# Patient Record
Sex: MALE | Race: WHITE | Employment: UNEMPLOYED | URBAN - METROPOLITAN AREA
[De-identification: names, ages, dates, MRNs, and addresses within clinical notes are randomized per-mention and may not be internally consistent; named-entity substitution may affect disease eponyms.]

---

## 2020-01-09 ENCOUNTER — HOSPITAL ENCOUNTER (OUTPATIENT)
Age: 30
Setting detail: OBSERVATION
Discharge: HOME | End: 2020-01-10
Attending: EMERGENCY MEDICINE | Admitting: INTERNAL MEDICINE
Payer: COMMERCIAL

## 2020-01-09 ENCOUNTER — APPOINTMENT (OUTPATIENT)
Dept: CT IMAGING | Age: 30
End: 2020-01-09
Payer: COMMERCIAL

## 2020-01-09 ENCOUNTER — APPOINTMENT (OUTPATIENT)
Dept: GENERAL RADIOLOGY | Age: 30
End: 2020-01-09
Payer: COMMERCIAL

## 2020-01-09 PROBLEM — R51.9 HEADACHE, UNSPECIFIED HEADACHE TYPE: Status: ACTIVE | Noted: 2020-01-09

## 2020-01-09 LAB
ABSOLUTE EOS #: 0.5 K/UL (ref 0–0.4)
ABSOLUTE IMMATURE GRANULOCYTE: ABNORMAL K/UL (ref 0–0.3)
ABSOLUTE LYMPH #: 2.4 K/UL (ref 1–4.8)
ABSOLUTE MONO #: 0.6 K/UL (ref 0.1–1.3)
ALBUMIN SERPL-MCNC: 4.7 G/DL (ref 3.5–5.2)
ALBUMIN/GLOBULIN RATIO: ABNORMAL (ref 1–2.5)
ALP BLD-CCNC: 79 U/L (ref 40–129)
ALT SERPL-CCNC: 47 U/L (ref 5–41)
AMPHETAMINE SCREEN URINE: NEGATIVE
ANION GAP SERPL CALCULATED.3IONS-SCNC: 15 MMOL/L (ref 9–17)
AST SERPL-CCNC: 39 U/L
BARBITURATE SCREEN URINE: NEGATIVE
BASOPHILS # BLD: 0 % (ref 0–2)
BASOPHILS ABSOLUTE: 0 K/UL (ref 0–0.2)
BENZODIAZEPINE SCREEN, URINE: NEGATIVE
BILIRUB SERPL-MCNC: 0.52 MG/DL (ref 0.3–1.2)
BILIRUBIN URINE: NEGATIVE
BUN BLDV-MCNC: 22 MG/DL (ref 6–20)
BUN/CREAT BLD: ABNORMAL (ref 9–20)
BUPRENORPHINE URINE: NORMAL
CALCIUM SERPL-MCNC: 9.7 MG/DL (ref 8.6–10.4)
CANNABINOID SCREEN URINE: NEGATIVE
CHLORIDE BLD-SCNC: 98 MMOL/L (ref 98–107)
CO2: 23 MMOL/L (ref 20–31)
COCAINE METABOLITE, URINE: NEGATIVE
COLOR: YELLOW
COMMENT UA: NORMAL
CREAT SERPL-MCNC: 0.99 MG/DL (ref 0.7–1.2)
DIFFERENTIAL TYPE: ABNORMAL
DIRECT EXAM: NORMAL
EOSINOPHILS RELATIVE PERCENT: 5 % (ref 0–4)
GFR AFRICAN AMERICAN: >60 ML/MIN
GFR NON-AFRICAN AMERICAN: >60 ML/MIN
GFR SERPL CREATININE-BSD FRML MDRD: ABNORMAL ML/MIN/{1.73_M2}
GFR SERPL CREATININE-BSD FRML MDRD: ABNORMAL ML/MIN/{1.73_M2}
GLUCOSE BLD-MCNC: 72 MG/DL (ref 75–110)
GLUCOSE BLD-MCNC: 89 MG/DL (ref 70–99)
GLUCOSE URINE: NEGATIVE
HCT VFR BLD CALC: 47.5 % (ref 41–53)
HEMOGLOBIN: 15.8 G/DL (ref 13.5–17.5)
IMMATURE GRANULOCYTES: ABNORMAL %
KETONES, URINE: NEGATIVE
LEUKOCYTE ESTERASE, URINE: NEGATIVE
LIPASE: 23 U/L (ref 13–60)
LYMPHOCYTES # BLD: 24 % (ref 24–44)
Lab: NORMAL
MCH RBC QN AUTO: 28.5 PG (ref 26–34)
MCHC RBC AUTO-ENTMCNC: 33.2 G/DL (ref 31–37)
MCV RBC AUTO: 85.9 FL (ref 80–100)
MDMA URINE: NORMAL
METHADONE SCREEN, URINE: NEGATIVE
METHAMPHETAMINE, URINE: NORMAL
MONOCYTES # BLD: 6 % (ref 1–7)
NITRITE, URINE: NEGATIVE
NRBC AUTOMATED: ABNORMAL PER 100 WBC
OPIATES, URINE: NEGATIVE
OXYCODONE SCREEN URINE: NEGATIVE
PDW BLD-RTO: 13.2 % (ref 11.5–14.9)
PH UA: 6 (ref 5–8)
PHENCYCLIDINE, URINE: NEGATIVE
PLATELET # BLD: 289 K/UL (ref 150–450)
PLATELET ESTIMATE: ABNORMAL
PMV BLD AUTO: 7.2 FL (ref 6–12)
POTASSIUM SERPL-SCNC: 3.9 MMOL/L (ref 3.7–5.3)
PROPOXYPHENE, URINE: NORMAL
PROTEIN UA: NEGATIVE
RBC # BLD: 5.53 M/UL (ref 4.5–5.9)
RBC # BLD: ABNORMAL 10*6/UL
SEG NEUTROPHILS: 65 % (ref 36–66)
SEGMENTED NEUTROPHILS ABSOLUTE COUNT: 6.4 K/UL (ref 1.3–9.1)
SODIUM BLD-SCNC: 136 MMOL/L (ref 135–144)
SPECIFIC GRAVITY UA: 1.02 (ref 1–1.03)
SPECIMEN DESCRIPTION: NORMAL
TEST INFORMATION: NORMAL
TOTAL PROTEIN: 8 G/DL (ref 6.4–8.3)
TRICYCLIC ANTIDEPRESSANTS, UR: NORMAL
TSH SERPL DL<=0.05 MIU/L-ACNC: 2.38 MIU/L (ref 0.3–5)
TURBIDITY: CLEAR
URINE HGB: NEGATIVE
UROBILINOGEN, URINE: NORMAL
WBC # BLD: 9.9 K/UL (ref 3.5–11)
WBC # BLD: ABNORMAL 10*3/UL

## 2020-01-09 PROCEDURE — 96374 THER/PROPH/DIAG INJ IV PUSH: CPT

## 2020-01-09 PROCEDURE — 71045 X-RAY EXAM CHEST 1 VIEW: CPT

## 2020-01-09 PROCEDURE — 70496 CT ANGIOGRAPHY HEAD: CPT

## 2020-01-09 PROCEDURE — 82947 ASSAY GLUCOSE BLOOD QUANT: CPT

## 2020-01-09 PROCEDURE — G0378 HOSPITAL OBSERVATION PER HR: HCPCS

## 2020-01-09 PROCEDURE — 96375 TX/PRO/DX INJ NEW DRUG ADDON: CPT

## 2020-01-09 PROCEDURE — 2580000003 HC RX 258: Performed by: INTERNAL MEDICINE

## 2020-01-09 PROCEDURE — 99285 EMERGENCY DEPT VISIT HI MDM: CPT

## 2020-01-09 PROCEDURE — 84443 ASSAY THYROID STIM HORMONE: CPT

## 2020-01-09 PROCEDURE — 83690 ASSAY OF LIPASE: CPT

## 2020-01-09 PROCEDURE — 85025 COMPLETE CBC W/AUTO DIFF WBC: CPT

## 2020-01-09 PROCEDURE — 99446 NTRPROF PH1/NTRNET/EHR 5-10: CPT | Performed by: PSYCHIATRY & NEUROLOGY

## 2020-01-09 PROCEDURE — 6360000002 HC RX W HCPCS: Performed by: EMERGENCY MEDICINE

## 2020-01-09 PROCEDURE — 70450 CT HEAD/BRAIN W/O DYE: CPT

## 2020-01-09 PROCEDURE — 80053 COMPREHEN METABOLIC PANEL: CPT

## 2020-01-09 PROCEDURE — 81003 URINALYSIS AUTO W/O SCOPE: CPT

## 2020-01-09 PROCEDURE — 87804 INFLUENZA ASSAY W/OPTIC: CPT

## 2020-01-09 PROCEDURE — 6360000004 HC RX CONTRAST MEDICATION: Performed by: EMERGENCY MEDICINE

## 2020-01-09 PROCEDURE — 36415 COLL VENOUS BLD VENIPUNCTURE: CPT

## 2020-01-09 PROCEDURE — 2580000003 HC RX 258: Performed by: EMERGENCY MEDICINE

## 2020-01-09 PROCEDURE — 80307 DRUG TEST PRSMV CHEM ANLYZR: CPT

## 2020-01-09 RX ORDER — ONDANSETRON 2 MG/ML
4 INJECTION INTRAMUSCULAR; INTRAVENOUS EVERY 6 HOURS PRN
Status: DISCONTINUED | OUTPATIENT
Start: 2020-01-09 | End: 2020-01-10 | Stop reason: HOSPADM

## 2020-01-09 RX ORDER — DIPHENHYDRAMINE HYDROCHLORIDE 50 MG/ML
25 INJECTION INTRAMUSCULAR; INTRAVENOUS ONCE
Status: COMPLETED | OUTPATIENT
Start: 2020-01-09 | End: 2020-01-09

## 2020-01-09 RX ORDER — SODIUM CHLORIDE 0.9 % (FLUSH) 0.9 %
10 SYRINGE (ML) INJECTION PRN
Status: DISCONTINUED | OUTPATIENT
Start: 2020-01-09 | End: 2020-01-10 | Stop reason: HOSPADM

## 2020-01-09 RX ORDER — PROCHLORPERAZINE EDISYLATE 5 MG/ML
10 INJECTION INTRAMUSCULAR; INTRAVENOUS EVERY 6 HOURS PRN
Status: DISCONTINUED | OUTPATIENT
Start: 2020-01-09 | End: 2020-01-09

## 2020-01-09 RX ORDER — 0.9 % SODIUM CHLORIDE 0.9 %
80 INTRAVENOUS SOLUTION INTRAVENOUS ONCE
Status: COMPLETED | OUTPATIENT
Start: 2020-01-09 | End: 2020-01-09

## 2020-01-09 RX ORDER — SODIUM CHLORIDE 0.9 % (FLUSH) 0.9 %
10 SYRINGE (ML) INJECTION PRN
Status: DISCONTINUED | OUTPATIENT
Start: 2020-01-09 | End: 2020-01-10 | Stop reason: SDUPTHER

## 2020-01-09 RX ORDER — SODIUM CHLORIDE 0.9 % (FLUSH) 0.9 %
10 SYRINGE (ML) INJECTION EVERY 12 HOURS SCHEDULED
Status: DISCONTINUED | OUTPATIENT
Start: 2020-01-09 | End: 2020-01-10 | Stop reason: HOSPADM

## 2020-01-09 RX ORDER — 0.9 % SODIUM CHLORIDE 0.9 %
1000 INTRAVENOUS SOLUTION INTRAVENOUS ONCE
Status: COMPLETED | OUTPATIENT
Start: 2020-01-09 | End: 2020-01-09

## 2020-01-09 RX ORDER — SODIUM CHLORIDE 9 MG/ML
INJECTION, SOLUTION INTRAVENOUS CONTINUOUS
Status: DISCONTINUED | OUTPATIENT
Start: 2020-01-09 | End: 2020-01-10 | Stop reason: HOSPADM

## 2020-01-09 RX ADMIN — DIPHENHYDRAMINE HYDROCHLORIDE 25 MG: 50 INJECTION INTRAMUSCULAR; INTRAVENOUS at 19:01

## 2020-01-09 RX ADMIN — SODIUM CHLORIDE 1000 ML: 9 INJECTION, SOLUTION INTRAVENOUS at 19:01

## 2020-01-09 RX ADMIN — Medication 10 ML: at 23:00

## 2020-01-09 RX ADMIN — PROCHLORPERAZINE EDISYLATE 10 MG: 5 INJECTION INTRAMUSCULAR; INTRAVENOUS at 19:01

## 2020-01-09 RX ADMIN — IOPAMIDOL 75 ML: 755 INJECTION, SOLUTION INTRAVENOUS at 19:43

## 2020-01-09 RX ADMIN — Medication 10 ML: at 19:43

## 2020-01-09 RX ADMIN — SODIUM CHLORIDE 80 ML: 9 INJECTION, SOLUTION INTRAVENOUS at 19:43

## 2020-01-09 RX ADMIN — SODIUM CHLORIDE: 9 INJECTION, SOLUTION INTRAVENOUS at 23:00

## 2020-01-09 ASSESSMENT — ENCOUNTER SYMPTOMS
CONSTIPATION: 0
NAUSEA: 0
COLOR CHANGE: 0
DIARRHEA: 0
TROUBLE SWALLOWING: 0
BLOOD IN STOOL: 0
SORE THROAT: 0
VOMITING: 0
BACK PAIN: 0
SHORTNESS OF BREATH: 0
COUGH: 0
ABDOMINAL PAIN: 0

## 2020-01-09 ASSESSMENT — PAIN DESCRIPTION - LOCATION: LOCATION: HEAD

## 2020-01-09 ASSESSMENT — PAIN SCALES - GENERAL
PAINLEVEL_OUTOF10: 0
PAINLEVEL_OUTOF10: 7
PAINLEVEL_OUTOF10: 0

## 2020-01-09 NOTE — ED PROVIDER NOTES
[address]  eMERGENCY dEPARTMENT eNCOUnter    Pt Name: Cirilo Stockton  MRN: [id number]  YOB: 1990  Date of evaluation:1/9/20  PCP: No primary care provider on file. CHIEF COMPLAINT       Chief Complaint   Patient presents with    Headache    Excessive Sweating    Fever       HISTORY OF PRESENT ILLNESS    Cirilo Stockton is a 30 y.o. male who presents with a chief complaint of a headache. Patient is deaf and history is very difficult to obtain. Most of the information I obtained from urgent care note from earlier today. Apparently he had a headache that started around 1:30 PM today. Unclear whether or not this started acutely or not. Usually does not get headaches. Is diffuse, severe, nothing makes it better or worse. Per his family he has been having URI symptoms including fever, congestion for the past several days. His father states that he has been going to work and working double shifts and really wearing himself out. He states that he has not been eating or drinking. They have no family history of aneurysms. Most of this information was able to be confirmed with the patient through writing. His father does confirm that he has a cochlear implant however it does not function. Symptoms are acute. Symptoms are severe. Nothing else make symptoms better or worse. No other additional history able to be obtained. REVIEW OF SYSTEMS       Review of Systems   Constitutional: Positive for fatigue and fever. Negative for chills. HENT: Positive for congestion. Negative for ear pain, sore throat and trouble swallowing. Eyes: Negative for visual disturbance. Respiratory: Negative for cough and shortness of breath. Cardiovascular: Negative for chest pain, palpitations and leg swelling. Gastrointestinal: Negative for abdominal pain, blood in stool, constipation, diarrhea, nausea and vomiting. Genitourinary: Negative for dysuria and flank pain.    Musculoskeletal: visualized. Patient has a right-sided cochlear implant. Findings were discussed with Jerome White at 7:00 pm on 1/9/2020. ED BEDSIDE ULTRASOUND:      LABS:  Labs Reviewed   CBC WITH AUTO DIFFERENTIAL - Abnormal; Notable for the following components:       Result Value    Eosinophils % 5 (*)     Absolute Eos # 0.50 (*)     All other components within normal limits   COMPREHENSIVE METABOLIC PANEL - Abnormal; Notable for the following components:    BUN 22 (*)     ALT 47 (*)     All other components within normal limits   POC GLUCOSE FINGERSTICK - Abnormal; Notable for the following components:    POC Glucose 72 (*)     All other components within normal limits   RAPID INFLUENZA A/B ANTIGENS   LIPASE   URINE RT REFLEX TO CULTURE   URINE DRUG SCREEN   TSH WITH REFLEX   POCT GLUCOSE         EMERGENCY DEPARTMENT COURSE:   Vitals:    Vitals:    01/09/20 2130 01/09/20 2145 01/09/20 2200 01/09/20 2217   BP: 91/69 105/73 102/70 108/82   Pulse:  66 71 81   Resp:  20 20 13   Temp:    97.8 °F (36.6 °C)   TempSrc:    Oral   SpO2: 97%   98%   Weight:       Height:         10:22 PM  CT head is unremarkable. No obvious evidence of intracranial hemorrhage or any other acute findings. Patient is much more comfortable at this time. He was able to communicate that his headache has improved. He was resting comfortably on reexamination. I did speak with Dr. Darin Self interventional neurologist as patient was called as a stroke alert. We agreed that if anything that sounded like a hemorrhagic picture. He recommended getting a CTA head and neck and reevaluate after that. Head and neck CTA is unremarkable. No evidence of dissection, no evidence of aneurysm. Clinically I have a very low suspicion for subarachnoid hemorrhage at this time. Patient is clinically very much improved. His vital signs are normal.  He is very comfortable, sleeping, has no neurologic deficit from what we can see.   Per his family he seems to be acting at his baseline. I do think patient is most likely dehydrated and very fatigued from a viral illness. I spoke with family and patient at length about admission. Will admit overnight for IV fluids, reassessment in the morning. Clinically have a low suspicion for meningitis. Has been febrile here. He has no nuchal rigidity on my exam.  I spoke with Dr. Kate Williamson who accepted patient for admission. Bridging orders placed. CRITICALCARE:      CONSULTS:  IP CONSULT TO INTERNAL MEDICINE      PROCEDURES:      FINAL IMPRESSION      1. Headache, unspecified headache type    2. Viral illness            DISPOSITION/PLAN   DISPOSITION        Admission    PATIENT REFERRED TO:  No follow-up provider specified.     DISCHARGE MEDICATIONS:  New Prescriptions    No medications on file       (Please note that portions ofthis note were completed with a voice recognition program.  Efforts were made to edit the dictations but occasionally words are mis-transcribed.)    Teresa Patel DO  Attending Emergency Physician          Teresa Patel DO  01/09/20 9261

## 2020-01-09 NOTE — ED NOTES
Ivette Rinne returned page at [place] [address] and consult completed with .      Leah Addison  01/09/20 2207

## 2020-01-10 VITALS
TEMPERATURE: 97.5 F | WEIGHT: 180 LBS | HEART RATE: 75 BPM | OXYGEN SATURATION: 93 % | BODY MASS INDEX: 23.86 KG/M2 | DIASTOLIC BLOOD PRESSURE: 55 MMHG | HEIGHT: 73 IN | SYSTOLIC BLOOD PRESSURE: 93 MMHG | RESPIRATION RATE: 16 BRPM

## 2020-01-10 PROCEDURE — 96372 THER/PROPH/DIAG INJ SC/IM: CPT

## 2020-01-10 PROCEDURE — 99223 1ST HOSP IP/OBS HIGH 75: CPT | Performed by: INTERNAL MEDICINE

## 2020-01-10 PROCEDURE — G0378 HOSPITAL OBSERVATION PER HR: HCPCS

## 2020-01-10 PROCEDURE — 6360000002 HC RX W HCPCS: Performed by: INTERNAL MEDICINE

## 2020-01-10 PROCEDURE — 96375 TX/PRO/DX INJ NEW DRUG ADDON: CPT

## 2020-01-10 RX ORDER — BUTALBITAL, ACETAMINOPHEN AND CAFFEINE 300; 40; 50 MG/1; MG/1; MG/1
1 CAPSULE ORAL EVERY 4 HOURS PRN
Status: DISCONTINUED | OUTPATIENT
Start: 2020-01-10 | End: 2020-01-10 | Stop reason: HOSPADM

## 2020-01-10 RX ORDER — SODIUM CHLORIDE 0.9 % (FLUSH) 0.9 %
10 SYRINGE (ML) INJECTION PRN
Status: DISCONTINUED | OUTPATIENT
Start: 2020-01-10 | End: 2020-01-10 | Stop reason: SDUPTHER

## 2020-01-10 RX ORDER — SODIUM CHLORIDE 0.9 % (FLUSH) 0.9 %
10 SYRINGE (ML) INJECTION EVERY 12 HOURS SCHEDULED
Status: DISCONTINUED | OUTPATIENT
Start: 2020-01-10 | End: 2020-01-10 | Stop reason: SDUPTHER

## 2020-01-10 RX ADMIN — ENOXAPARIN SODIUM 40 MG: 40 INJECTION SUBCUTANEOUS at 09:54

## 2020-01-10 RX ADMIN — ONDANSETRON 4 MG: 2 INJECTION INTRAMUSCULAR; INTRAVENOUS at 09:54

## 2020-01-10 NOTE — ED NOTES
Pts father and step mother at bedside. Writer and Dr Ordaz Postal provided update. Father states pt is deaf bilaterally and uses written communication. Father states that pt has been \"working himself to death\" and adds that he has been working double shifts and picking up a lot of overtime. Father states pt has cochlear implant and that he has had headaches in the past. Father adds that pt has hx of suicidal ideation. Father states that pt texted him late afternoon stating he had a headache. Pt currently resting in bed with eyes closed. Continue to monitor.               Charanjit Borja RN  01/09/20 7308

## 2020-01-10 NOTE — DISCHARGE SUMMARY
[facility]     Department of Internal Medicine    Discharge Summary        Patient Identification:  Maryjane Holliday is a 30 y.o. male. :  1990  MRN: [id number]     Acct: [de-identified]   Admit Date:  2020  Discharge date and time: No discharge date for patient encounter. Attending Provider: Jina Espinoza MD                                     Admission Diagnoses:   Headache, unspecified headache type [R51]    Discharge Diagnoses:   Patient Active Problem List   Diagnosis    Headache, unspecified headache type        Consults:   none      Brief Inpatient course:  30 y.o. male with significant past medical history of ADHD and deafness s/p cochlear implant on right side presented with headache. Patient is deaf so history is obtained from chart review along with writing notes to patient. Patient states that he has had a migraine in the past, last time in 2017, when asked if he takes any medication at home for migraines, he states that he does take Advil which usually helps. It did not help this time. Patient states that he has not had nausea or vomiting but is sensitive to light. He points to the front of his head when asked where the headache is located. Per chart review, patient visited urgent care per medical yesterday and per them know his headache started at 1:30 PM, he states that this is the worst headache he has ever had. Patient also reported fever and dizziness and thus was sent to the ED for further work-up. Stroke alert was called, CT head was negative for subarachnoid or intraparenchymal hemorrhage, CTA showed no aneurysm. Blood pressure on admission was 116/67. Patient's headache improved with pain medication and he had no neurological deficits.     Procedures:  none    Any Hospital Acquired Infections: none      Discharge Functional Status:  stable      Disposition:   home      Patient Instructions:   Discharge Meds:-   Current Discharge Medication List

## 2020-01-10 NOTE — PROGRESS NOTES
Pt admitted to room 2050 from the ER. No signs of distress noted. Vital signs taken, assessment as charted. Pt is deaf and admission was completed via written communication with pen and paper. Will continue to monitor.

## 2020-01-10 NOTE — PROGRESS NOTES
Discharge instructions given to patient. Waiting for father to come and give patient his keys. Will go over discharge instructions with father also when he gets here.

## 2020-01-10 NOTE — VIRTUAL HEALTH
[facility],4Th Floor Stroke and Vascular Neurology Phone Consult for  [facility] Alert through [address] @ 3541 pm  1/9/2020 8:28 PM  Pt Name: Carlton Lerner  MRN: [id number]  YOB: 1990  Date of evaluation: 1/9/2020  Primary Care Physician: No primary care provider on file. Reason for Evaluation: Stroke evaluation with Phone Consult, Discussion and Review of imaging    Carlton Lerner is a 30 y.o. male with history of ADHD and deafness status post cochlear implant. The patient presented with acute onset headache for 1 day. Per ED physician exam the patient does not have a focal symptoms. Patient is hard of hearing and cannot follow commands but able to imitate. Antigravity all extremities. NIH stroke scale is 0. CT head with with streak artifact from the cochlear implant but no evidence of subarachnoid hemorrhage or intraparenchymal hemorrhage. CTA with no aneurysm. Blood pressure on presentation 116/67. Allergies  has No Known Allergies. Medications  Prior to Admission medications    Medication Sig Start Date End Date Taking? Authorizing Provider   ARIPiprazole (ABILIFY PO) Take by mouth    Historical Provider, MD    Scheduled Meds:  Continuous Infusions:  PRN Meds:.prochlorperazine, sodium chloride flush  Past Medical History   has a past medical history of ADHD (attention deficit hyperactivity disorder), Bipolar 1 disorder ([facility] [address] Deaf, and Depression.   Social History  Social History     Socioeconomic History    Marital status: Single     Spouse name: Not on file    Number of children: Not on file    Years of education: Not on file    Highest education level: Not on file   Occupational History    Not on file   Social Needs    Financial resource strain: Not on file    Food insecurity:     Worry: Not on file     Inability: Not on file    Transportation needs:     Medical: Not on file     Non-medical: Not on file   Tobacco Use    Smoking status: Never Smoker

## 2020-01-10 NOTE — CARE COORDINATION
CASE MANAGEMENT NOTE:    Admission Date:  1/9/2020 Napoleon Dowling is a 30 y.o.  male    Admitted for : Headache, unspecified headache type [R51]    Met with:  Patient    PCP: none                         Insurance:    [place] and medicare    Current Residence/ Living Arrangements:  independently at home             Current Services PTA:  No    Is patient agreeable to VNS: No    Freedom of choice provided: NA    List of [address] provided: NA    VNS chosen:  NA    DME:  cochlear implants - Deaf Bilaterally    Home Oxygen: No    Nebulizer: No    CPAP/BIPAP: No    Supplier: N/A    Potential Assistance Needed: No    SNF needed: No    Pharmacy:  [facility] [address]       Is the Patient an [address] with Readmission Risk Score greater than 14%? No  If yes, pt needs a follow up appointment made within 7 days. Does Patient want to use MEDS to BEDS? No    Family Members/Caregivers that pt would like involved in their care:    Yes    If yes, list name here:  Father Verner Murray: Patient             Is patient in Isolation/One on One/Altered Mental Status? No  If yes, skip next question. If no, would they like an I-Pad to  use? No  If yes, call [phone]. Discharge Plan:  1/10/20 - [place] - Patient from home with father, Patient is deaf, has cochlear implants, Denies need for VNS,   New PCP appt made with Dr Manuel Crowder for Monday 1/13/2020 at 4:00p.m. , Patient is discharging today, Father called and let a voice mail message of discharge order. Will continue to follow.  //pf                Electronically signed by: Darin Sen RN on 1/10/2020 at 2:51 PM

## 2020-01-10 NOTE — PROGRESS NOTES
Physical Therapy  DATE: 1/10/2020    NAME: Yuniel Harrison  MRN: [id number]   : 1990    Patient not seen this date for Physical Therapy due to:  [] Blood transfusion in progress  [] Hemodialysis  []  Patient Declined  [] Spine Precautions   [] Strict Bedrest  [] Surgery/ Procedure  [] Testing      [] Other        [x] PT being discontinued at this time. Patient independent. No further needs. 1/10/20: D/C PT. Pt IND in room, demonstrated IND ambulation. No stairs at home. Declines need for PT. Notified RHONDA Simental. 1322. [] PT being discontinued at this time as the patient has been transferred to palliative care. No further needs.     Iglesia Keith, PT

## 2020-01-10 NOTE — H&P
Take by mouth    Historical Provider, MD     Scheduled Meds:   sodium chloride flush  10 mL Intravenous 2 times per day    sodium chloride flush  10 mL Intravenous 2 times per day    enoxaparin  40 mg Subcutaneous Daily     Continuous Infusions:   sodium chloride 100 mL/hr at 01/09/20 2300     PRN Meds:butalbital-APAP-caffeine, sodium chloride flush, sodium chloride flush, magnesium hydroxide, ondansetron    No Known Allergies    SOCIAL HISTORY:   Social History     Socioeconomic History    Marital status: Single     Spouse name: Not on file    Number of children: Not on file    Years of education: Not on file    Highest education level: Not on file   Occupational History    Not on file   Social Needs    Financial resource strain: Not on file    Food insecurity:     Worry: Not on file     Inability: Not on file    Transportation needs:     Medical: Not on file     Non-medical: Not on file   Tobacco Use    Smoking status: Never Smoker   Substance and Sexual Activity    Alcohol use: No    Drug use: Not on file    Sexual activity: Never   Lifestyle    Physical activity:     Days per week: Not on file     Minutes per session: Not on file    Stress: Not on file   Relationships    Social connections:     Talks on phone: Not on file     Gets together: Not on file     Attends Restoration service: Not on file     Active member of club or organization: Not on file     Attends meetings of clubs or organizations: Not on file     Relationship status: Not on file    Intimate partner violence:     Fear of current or ex partner: Not on file     Emotionally abused: Not on file     Physically abused: Not on file     Forced sexual activity: Not on file   Other Topics Concern    Not on file   Social History Narrative    Not on file       FAMILY HISTORY:   History reviewed. No pertinent family history.     REVIEW OF SYSTEMS:  · Constitutional: Positive for subjective fever  · Eyes: Positive for photophobia  · ENT: Negative for mouth sores, sore throat. · Cardiovascular: Negative for lightheadedness ,chest pain, palpitations   · Respiratory:Negative for Shortness of breath,cough or wheezing. · Gastrointestinal: Negative for nausea/vomiting, change in bowel habits, abdominal pain  · Genitourinary:Negative for change in bladder habits, dysuria, hematuria. · Musculoskeletal: Negative for joint pain   · Neurological: Positive for headache      PHYSICAL EXAM:  /75   Pulse 62   Temp 97.7 °F (36.5 °C) (Oral)   Resp 14   Ht 6' 1\" (1.854 m)   Wt 180 lb (81.6 kg)   SpO2 98%   BMI 23.75 kg/m²    Wt Readings from Last 3 Encounters:   01/09/20 180 lb (81.6 kg)   10/18/16 175 lb (79.4 kg)       · General appearance: Sleepy, drowsy, hard of hearing. · Head: Normocephalic, no lesions, without obvious abnormality. · Eye no icterus no redness, pupils equal, round and reactive to light   · Lungs: clear to auscultation bilaterally  · Heart: regular rate and rhythm, S1, S2 normal, no murmur  · Abdomen: soft, non-tender; bowel sounds normal; no masses,  no organomegaly  · Extremities: extremities normal, atraumatic, no cyanosis or edema  · Neurological:  Awake, alert, oriented to name, place and time. Cranial nerves II-XII are grossly intact. Sensation grossly normal  · Psych: normal affect       LABS:  CBC:   Recent Labs     01/09/20  1830   WBC 9.9   RBC 5.53   HGB 15.8   HCT 47.5   MCV 85.9   RDW 13.2        BMP:   Recent Labs     01/09/20  1830      K 3.9   CL 98   CO2 23   BUN 22*   CREATININE 0.99     ANEMIA STUDIES  No results for input(s): LABIRON, TIBC, FERRITIN, SAMSJUYC09, FOLATE, OCCULTBLD in the last 72 hours. BNP: No results for input(s): BNP in the last 72 hours. PT/INR: No results for input(s): PROTIME, INR in the last 72 hours. APTT: No results for input(s): APTT in the last 72 hours. CARDIAC ENZYMES: No results for input(s): CKMB, CKMBINDEX, TROPONINI in the last 72 hours.     Invalid input(s): enoxaparin  40 mg Subcutaneous Daily     Continuous Infusions:    sodium chloride 100 mL/hr at 01/09/20 2300     PRN Meds: butalbital-APAP-caffeine, sodium chloride flush, magnesium hydroxide, ondansetron          Condition    [] ill ,     [] high risk , [] critical ,        [] improved but still labile                                        [] delirium ,      [] -----,                 [] I----     Unit  [] ICU           [] PICU       [x] MED_SRG             []  Other  Prognosis -          ---Patient has improved significantly  Denies any headaches today  Vital stable  He is agreeable to go home today  We will discharge  Patient is deaf-mute- ;     [facility] [address], [address] [place] [address].    Phone [phone]   Fax: [phone]  Answering Service: [phone]

## 2020-01-10 NOTE — FLOWSHEET NOTE
Patient waiting to be discharged; no needs     01/10/20 1550   Encounter Summary   Services provided to: Patient   Referral/Consult From: Rounding   Continue Visiting   (1/10/20)   Complexity of Encounter Low   Length of Encounter 15 minutes   Spiritual Assessment Completed Yes   Routine   Type Initial   Assessment Approachable; Hopeful;Coping   Intervention [place];Sustaining presence/ Ministry of presence; Discussed illness/injury and it's impact   Outcome Expressed gratitude;Engaged in conversation;Expressed feelings/needs/concerns;Coping; Hopeful;Receptive

## 2023-03-06 ENCOUNTER — HOSPITAL ENCOUNTER (EMERGENCY)
Age: 33
Discharge: HOME | End: 2023-03-07
Attending: EMERGENCY MEDICINE
Payer: COMMERCIAL

## 2023-03-06 DIAGNOSIS — R07.9 CHEST PAIN, UNSPECIFIED TYPE: Primary | ICD-10-CM

## 2023-03-06 PROCEDURE — 99285 EMERGENCY DEPT VISIT HI MDM: CPT

## 2023-03-06 PROCEDURE — 96374 THER/PROPH/DIAG INJ IV PUSH: CPT

## 2023-03-06 ASSESSMENT — PAIN DESCRIPTION - LOCATION: LOCATION: CHEST

## 2023-03-06 ASSESSMENT — PAIN SCALES - GENERAL: PAINLEVEL_OUTOF10: 4

## 2023-03-06 NOTE — Clinical Note
Miles Nazario was seen and treated in our emergency department on 3/6/2023. He may return to work on 03/08/2023. If you have any questions or concerns, please don't hesitate to call.       Anshul Merida MD

## 2023-03-07 ENCOUNTER — APPOINTMENT (OUTPATIENT)
Dept: GENERAL RADIOLOGY | Age: 33
End: 2023-03-07
Payer: COMMERCIAL

## 2023-03-07 VITALS
SYSTOLIC BLOOD PRESSURE: 111 MMHG | RESPIRATION RATE: 18 BRPM | HEART RATE: 74 BPM | TEMPERATURE: 98.7 F | DIASTOLIC BLOOD PRESSURE: 81 MMHG | HEIGHT: 73 IN | BODY MASS INDEX: 26.51 KG/M2 | WEIGHT: 200 LBS | OXYGEN SATURATION: 95 %

## 2023-03-07 LAB
ABSOLUTE EOS #: 0.76 K/UL (ref 0–0.44)
ABSOLUTE IMMATURE GRANULOCYTE: 0.04 K/UL (ref 0–0.3)
ABSOLUTE LYMPH #: 3.66 K/UL (ref 1.1–3.7)
ABSOLUTE MONO #: 0.86 K/UL (ref 0.1–1.2)
ANION GAP SERPL CALCULATED.3IONS-SCNC: 13 MMOL/L (ref 9–17)
BASOPHILS # BLD: 1 % (ref 0–2)
BASOPHILS ABSOLUTE: 0.05 K/UL (ref 0–0.2)
BUN SERPL-MCNC: 26 MG/DL (ref 6–20)
CALCIUM SERPL-MCNC: 9.2 MG/DL (ref 8.6–10.4)
CHLORIDE SERPL-SCNC: 101 MMOL/L (ref 98–107)
CO2 SERPL-SCNC: 23 MMOL/L (ref 20–31)
CREAT SERPL-MCNC: 1.04 MG/DL (ref 0.7–1.2)
EOSINOPHILS RELATIVE PERCENT: 7 % (ref 1–4)
GFR SERPL CREATININE-BSD FRML MDRD: >60 ML/MIN/1.73M2
GLUCOSE SERPL-MCNC: 96 MG/DL (ref 70–99)
HCT VFR BLD AUTO: 44.6 % (ref 40.7–50.3)
HGB BLD-MCNC: 15 G/DL (ref 13–17)
IMMATURE GRANULOCYTES: 0 %
LYMPHOCYTES # BLD: 34 % (ref 24–43)
MCH RBC QN AUTO: 29.2 PG (ref 25.2–33.5)
MCHC RBC AUTO-ENTMCNC: 33.6 G/DL (ref 28.4–34.8)
MCV RBC AUTO: 86.9 FL (ref 82.6–102.9)
MONOCYTES # BLD: 8 % (ref 3–12)
NRBC AUTOMATED: 0 PER 100 WBC
PDW BLD-RTO: 12.5 % (ref 11.8–14.4)
PLATELET # BLD AUTO: 300 K/UL (ref 138–453)
PMV BLD AUTO: 9.2 FL (ref 8.1–13.5)
POTASSIUM SERPL-SCNC: 3.9 MMOL/L (ref 3.7–5.3)
RBC # BLD: 5.13 M/UL (ref 4.21–5.77)
SEG NEUTROPHILS: 50 % (ref 36–65)
SEGMENTED NEUTROPHILS ABSOLUTE COUNT: 5.46 K/UL (ref 1.5–8.1)
SODIUM SERPL-SCNC: 137 MMOL/L (ref 135–144)
TROPONIN I SERPL DL<=0.01 NG/ML-MCNC: 10 NG/L (ref 0–22)
TROPONIN I SERPL DL<=0.01 NG/ML-MCNC: 8 NG/L (ref 0–22)
WBC # BLD AUTO: 10.8 K/UL (ref 3.5–11.3)

## 2023-03-07 PROCEDURE — 6370000000 HC RX 637 (ALT 250 FOR IP)

## 2023-03-07 PROCEDURE — 2500000003 HC RX 250 WO HCPCS

## 2023-03-07 PROCEDURE — 80048 BASIC METABOLIC PNL TOTAL CA: CPT

## 2023-03-07 PROCEDURE — 85025 COMPLETE CBC W/AUTO DIFF WBC: CPT

## 2023-03-07 PROCEDURE — 2580000003 HC RX 258

## 2023-03-07 PROCEDURE — 93005 ELECTROCARDIOGRAM TRACING: CPT

## 2023-03-07 PROCEDURE — 71045 X-RAY EXAM CHEST 1 VIEW: CPT

## 2023-03-07 PROCEDURE — A4216 STERILE WATER/SALINE, 10 ML: HCPCS

## 2023-03-07 PROCEDURE — 84484 ASSAY OF TROPONIN QUANT: CPT

## 2023-03-07 RX ORDER — ACETAMINOPHEN 500 MG
1000 TABLET ORAL ONCE
Status: COMPLETED | OUTPATIENT
Start: 2023-03-07 | End: 2023-03-07

## 2023-03-07 RX ORDER — FAMOTIDINE 20 MG/1
20 TABLET, FILM COATED ORAL 2 TIMES DAILY
Qty: 14 TABLET | Refills: 0 | Status: SHIPPED | OUTPATIENT
Start: 2023-03-07 | End: 2023-03-14

## 2023-03-07 RX ADMIN — FAMOTIDINE 20 MG: 10 INJECTION, SOLUTION INTRAVENOUS at 00:30

## 2023-03-07 RX ADMIN — ACETAMINOPHEN 1000 MG: 500 TABLET ORAL at 00:30

## 2023-03-07 ASSESSMENT — HEART SCORE: ECG: 0

## 2023-03-07 NOTE — ED TRIAGE NOTES
Pt presents to the ED via EMS , ASL  obtained. Pt c/ochest pain that started while at work today around [id number]. Pt states that he started to feel funny and started to feel a tightening around his chest, states that he had to double over to his knees when the tightening started. Pt states that the pain did not radiate at all and lasted around 15 minutes. Pt states that the pain has subsided at this time since given ASA from EMS. Pt denies SOB at this time but does state that he had some difficulty breathing when chest pain was occurring. . No numbness or tingling of extremities. No headache or dizziness. Pt is resting on stretcher with call light within reach. Breathing is non labored and no acute distress is noted.  Will continue to follow plan of care

## 2023-03-07 NOTE — ED NOTES
The following labs were labeled with appropriate pt sticker and tubed to lab:     [] Blue     [] Lavender   [] on ice  [x] Green/yellow  [] Green/black [] on ice  [] Claudeen Porch  [] on ice  [] Yellow  [] Red  [] Type/ Screen  [] ABG  [] VBG    [] COVID-19 swab    [] Rapid  [] PCR  [] Flu swab  [] Peds Viral Panel     [] Urine Sample  [] Fecal Sample  [] Pelvic Cultures  [] Blood Cultures  [] X 2  [] STREP Cultures         Bernardo Sheridan RN  03/07/23 8979

## 2023-03-07 NOTE — ED NOTES
The following labs labeled with pt sticker and tubed to lab:     [x] Blue     [x] Lavender   [] on ice  [x] Green/yellow  [x] Green/black [] on ice  [] Yellow  [] Red  [] Pink      [] COVID-19 swab    [] Rapid  [] PCR  [] Flu Swab  [] Strep Swab  [] Peds Viral Panel     [] Urine Sample  [] Pelvic Cultures  [] Blood Cultures   [] Wound Cultures          Berto Joaquin RN  03/07/23 0010

## 2023-03-07 NOTE — ED PROVIDER NOTES
[facility]  ED  Emergency Department Encounter  Emergency Medicine Resident     Pt Charles Storey  MRN: [id number]  Joshgfzoila 1990  Date of evaluation: 3/6/23  PCP:  No primary care provider on file. Note Started: 11:46 PM EST      CHIEF COMPLAINT       Chief Complaint   Patient presents with    Chest Pain     Started today around 0681 [id number] 12 72 while at work        HISTORY OF PRESENT ILLNESS  (Location/Symptom, Timing/Onset, Context/Setting, Quality, Duration, Modifying Factors, Severity.)      Claire Subramanian is a 33 y.o. male who presents with complaints of midsternal chest pain that started when he was at work and lasted approximately 10 minutes with some improvement after rest.  No shortness of breath. No regular medications. Patient uses sign language at baseline. No history of smoking, illegal drugs, family history of heart disease below the age of 48. PAST MEDICAL / SURGICAL / SOCIAL / FAMILY HISTORY      has a past medical history of ADHD (attention deficit hyperactivity disorder), Bipolar 1 disorder ([address] Deaf, and Depression. Reviewed with patient     has a past surgical history that includes Cochlear implant.   Reviewed with patient    Social History     Socioeconomic History    Marital status: Single     Spouse name: Not on file    Number of children: Not on file    Years of education: Not on file    Highest education level: Not on file   Occupational History    Not on file   Tobacco Use    Smoking status: Never    Smokeless tobacco: Not on file   Substance and Sexual Activity    Alcohol use: No    Drug use: Not on file    Sexual activity: Never   Other Topics Concern    Not on file   Social History Narrative    Not on file     Social Determinants of Health     Financial Resource Strain: Not on file   Food Insecurity: Not on file   Transportation Needs: Not on file   Physical Activity: Not on file   Stress: Not on file   Social Connections: Not on file   Intimate Partner Violence: Not on file   Housing Stability: Not on file       No family history on file. Allergies:  Patient has no known allergies. Home Medications:  Prior to Admission medications    Medication Sig Start Date End Date Taking? Authorizing Provider   famotidine (PEPCID) 20 MG tablet Take 1 tablet by mouth 2 times daily for 7 days 3/7/23 3/14/23 Yes Flako Brandon MD   ARIPiprazole (ABILIFY PO) Take by mouth    Historical Provider, MD         REVIEW OF SYSTEMS    (2-9 systems for level 4, 10 or more for level 5)      Review of Systems   Constitutional:  Negative for chills and fever. HENT:  Negative for congestion and rhinorrhea. Eyes:  Negative for photophobia and visual disturbance. Respiratory:  Negative for shortness of breath and wheezing. Cardiovascular: Positive for chest pain and palpitations. Gastrointestinal:  Negative for abdominal pain, nausea and vomiting. Genitourinary:  Negative for dysuria and frequency. Musculoskeletal:  Negative for back pain and neck pain. Skin:  Negative for rash and wound. Neurological:  Negative for dizziness and headaches. PHYSICAL EXAM   (up to 7 for level 4, 8 or more for level 5)      INITIAL VITALS:   /81   Pulse 74   Temp 98.7 °F (37.1 °C) (Oral)   Resp 18   Ht 6' 1\" (1.854 m)   Wt 200 lb (90.7 kg)   SpO2 95%   BMI 26.39 kg/m²     Physical Exam  Vitals and nursing note reviewed. Constitutional:       General: He is not in acute distress. HENT:      Head: Atraumatic. Right Ear: External ear normal.      Left Ear: External ear normal.      Nose: Nose normal.      Mouth/Throat:      Mouth: Mucous membranes are moist.      Pharynx: Oropharynx is clear. Eyes:      Conjunctiva/sclera: Conjunctivae normal.   Cardiovascular:      Rate and Rhythm: Normal rate and regular rhythm. Pulses: Normal pulses.    Reproducible left-sided chest wall tenderness to palpation  Pulmonary:      Effort: Pulmonary effort is normal. No respiratory distress. Breath sounds: Normal breath sounds. No wheezing. Abdominal:      Palpations: Abdomen is soft. Tenderness: There is no abdominal tenderness. Musculoskeletal:         General: Normal range of motion. Cervical back: Normal range of motion. Skin:     General: Skin is warm and dry. Capillary Refill: Capillary refill takes less than 2 seconds. Neurological:      General: No focal deficit present. Mental Status: He is alert and oriented to person, place, and time. DDX/DIAGNOSTIC RESULTS / EMERGENCY DEPARTMENT COURSE / MDM     Medical Decision Making  Amount and/or Complexity of Data Reviewed  Labs: ordered. Decision-making details documented in ED Course. Radiology: ordered. ECG/medicine tests: ordered. Risk  OTC drugs. EKG  No ST changes or T wave abnormality    All EKG's are interpreted by the Emergency Department Physician who either signs or Co-signs this chart in the absence of a cardiologist.    EMERGENCY DEPARTMENT COURSE:  70-year-old male, previously healthy, presented to ED with complaints of midsternal chest pain that started when he was at work and being active and improved with rest.  On exam, afebrile, nontachycardic, reproducible left-sided chest wall tenderness, no edema. EKG normal sinus rhythm without any ST or T wave abnormalities. Cardiac work-up unremarkable. Heart score 2. Patient discharged home instructed to follow-up with PCP and return to ED for any return of chest pain, shortness of breath, vomiting, fever. ED Course as of 23 0624   Tue Mar 07, 2023   0142 Troponin, High Sensitivity: 8 [AR]   0142 First trop 10, second 8 [AR]      ED Course User Index  [AR] Elvin Yost MD     History: 1  EC  Patient Age: 1  *Risk factors for Atherosclerotic disease: Obesity  Risk Factors: 1  Troponin: 0  Heart Score Total: 2   PROCEDURES:  None    CONSULTS:  None      FINAL IMPRESSION      1.  Chest pain, unspecified type          DISPOSITION / PLAN     DISPOSITION Decision To Discharge 03/07/2023 01:42:20 AM      PATIENT REFERRED TO:  [address] [place] [address] [id number]  Call in 2 days      [facility] ED  [address] [place] [address]  [phone]    As needed    DISCHARGE MEDICATIONS:  Discharge Medication List as of 3/7/2023  1:42 AM          Reyes Ellington MD  Emergency Medicine Resident    (Please note that portions of thisnote were completed with a voice recognition program.  Efforts were made to edit the dictations but occasionally words are mis-transcribed.)        Sandra Louise MD  Resident  03/07/23 8333

## 2023-03-07 NOTE — DISCHARGE INSTRUCTIONS
Thank you for visiting [facility]. You need to call [facility] to make an appointment as directed for follow up. Should you have any questions regarding your care or further treatment, please call [facility] at [phone]. Please return to emergency department for any new or worrisome symptoms including any chest pain, shortness of breath, vomiting, fever.

## 2023-03-07 NOTE — ED PROVIDER NOTES
[address] [facility]   Emergency Department  Faculty Attestation       I performed a history and physical examination of the patient and discussed management with the resident. I reviewed the residents note and agree with the documented findings including all diagnostic interpretations and plan of care. Any areas of disagreement are noted on the chart. I was personally present for the key portions of any procedures. I have documented in the chart those procedures where I was not present during the key portions. I have reviewed the emergency nurses triage note. I agree with the chief complaint, past medical history, past surgical history, allergies, medications, social and family history as documented unless otherwise noted below. Documentation of the HPI, Physical Exam and Medical Decision Making performed by scribledy is based on my personal performance of the HPI, PE and MDM. For Physician Assistant/ Nurse Practitioner cases/documentation I have personally evaluated this patient and have completed at least one if not all key elements of the E/M (history, physical exam, and MDM). Additional findings are as noted. Pertinent Comments     Primary Care Physician: No primary care provider on file. ED Triage Vitals   BP Temp Temp Source Heart Rate Resp SpO2 Height Weight   03/06/23 2340 03/06/23 2334 03/06/23 2334 03/06/23 2340 03/06/23 2340 03/07/23 0015 03/06/23 2347 03/06/23 2347   134/84 98.7 °F (37.1 °C) Oral 80 16 95 % 6' 1\" (1.854 m) 200 lb (90.7 kg)        This is a 33 y.o. male who presents to the Emergency Department with chest pain that started tonight at work, with some associated diaphoresis. Midsternal.  Nonradiating. Is tender to the touch. No associated shortness of breath. No history of cardiac disorders. Patient is deaf and communicates via ASL. Therefore  was used to communicate and perform exam.  On exam he is fatiguing sleeping but awakens to tactile stimuli. Heart sounds regular lungs auscultation with reproducible chest wall tenderness just lateral to the sternal border. No crepitus. Lungs auscultation. Alert and oriented x4. Medical Decision Making  DDx:angina, ACS, NSTEMI, STEMI, arrhythmia, PE, pneumonia, GERD, musculoskeletal, anxiety    Patient does have reproducible chest discomfort which is likely musculoskeletal.  However we will also get cardiac work-up including EKG, troponins, basic labs. PERC negative, therefore low suspicion of PE and no further work-up needed. Heart score of 2. Therefore low risk for major acute coronary event in the next 6 weeks. Okay for discharge with outpatient follow-up. Will have social work help try to arrange him being able to get back to his car.  used to go over patient results    Problems Addressed:  Chest pain, unspecified type: acute illness or injury    Amount and/or Complexity of Data Reviewed  Labs: ordered. Decision-making details documented in ED Course. Radiology: ordered. ECG/medicine tests: ordered and independent interpretation performed. Details: See below    Risk  OTC drugs. Heart Score:   Heart Score for chest pain patients  History: Moderately Suspicious  ECG: Normal  Patient Age: < 45 years  *Risk factors for Atherosclerotic disease: Obesity  Risk Factors: 1 or 2 risk factors  Troponin: < 1X normal limit  Heart Score Total: 2    Score 0-3: 2.5% MACE over next 6 weeks = Discharge Home  Score 4-6: 20.3% MACE over next 6 weeks = Admit for Clinical Observation  Score 7-10: 72.7% MACE over next 6 weeks = Early Invasive Strategies   Chest Pain in the Emergency Room: A Multicenter Validation of the [address] Luisana BE, Stanley AJ, Amilcar KWASI, Miki TP, [employer], Miki EG, Ana SH, [facility] [address]. Crit Pathw Cardiol. 2010 Sep; 9(3): 164-169. \"A prospective validation of the HEART score for chest pain patients at the emergency department. \" Int Isidra Pizano Cardiol. 2013 Oct 3;168(3):2153-8. doi: 10.1016/j.ijcard. 2013.01.255. Epub 2013 Mar 7.     EKG Interpretation    Interpreted by emergency department physician    Clinical Impression: Nonspecific EKG with no signs of ST abnormalities    Chadwick Ferguson MD    Critical Care: None     Chadwick Ferguson MD  Attending Emergency Physician         Chadwick Ferguson MD  03/07/23 1007

## 2023-03-08 LAB
EKG ATRIAL RATE: 75 BPM
EKG P AXIS: 25 DEGREES
EKG P-R INTERVAL: 160 MS
EKG Q-T INTERVAL: 388 MS
EKG QRS DURATION: 84 MS
EKG QTC CALCULATION (BAZETT): 433 MS
EKG R AXIS: 23 DEGREES
EKG T AXIS: 41 DEGREES
EKG VENTRICULAR RATE: 75 BPM

## 2023-03-08 PROCEDURE — 93010 ELECTROCARDIOGRAM REPORT: CPT | Performed by: INTERNAL MEDICINE

## 2023-05-10 ENCOUNTER — HOSPITAL ENCOUNTER (INPATIENT)
Age: 33
LOS: 3 days | Discharge: HOME | End: 2023-05-13
Attending: EMERGENCY MEDICINE | Admitting: PSYCHIATRY & NEUROLOGY
Payer: COMMERCIAL

## 2023-05-10 DIAGNOSIS — R45.851 SUICIDAL IDEATION: Primary | ICD-10-CM

## 2023-05-10 PROBLEM — F32.A DEPRESSION WITH SUICIDAL IDEATION: Status: ACTIVE | Noted: 2023-05-10

## 2023-05-10 LAB
ABSOLUTE EOS #: 0.4 K/UL (ref 0–0.4)
ABSOLUTE LYMPH #: 2 K/UL (ref 1–4.8)
ABSOLUTE MONO #: 0.8 K/UL (ref 0.1–1.3)
ACETAMINOPHEN LEVEL: <5 UG/ML (ref 10–30)
ALBUMIN SERPL-MCNC: 4.9 G/DL (ref 3.5–5.2)
ALP SERPL-CCNC: 90 U/L (ref 40–129)
ALT SERPL-CCNC: 44 U/L (ref 5–41)
AMPHETAMINE SCREEN URINE: NEGATIVE
ANION GAP SERPL CALCULATED.3IONS-SCNC: 13 MMOL/L (ref 9–17)
AST SERPL-CCNC: 33 U/L
BARBITURATE SCREEN URINE: NEGATIVE
BASOPHILS # BLD: 0 % (ref 0–2)
BASOPHILS ABSOLUTE: 0 K/UL (ref 0–0.2)
BENZODIAZEPINE SCREEN, URINE: NEGATIVE
BILIRUB SERPL-MCNC: 0.4 MG/DL (ref 0.3–1.2)
BUN SERPL-MCNC: 19 MG/DL (ref 6–20)
CALCIUM SERPL-MCNC: 9.5 MG/DL (ref 8.6–10.4)
CANNABINOID SCREEN URINE: NEGATIVE
CHLORIDE SERPL-SCNC: 101 MMOL/L (ref 98–107)
CO2 SERPL-SCNC: 23 MMOL/L (ref 20–31)
COCAINE METABOLITE, URINE: NEGATIVE
CREAT SERPL-MCNC: 0.95 MG/DL (ref 0.7–1.2)
EOSINOPHILS RELATIVE PERCENT: 4 % (ref 0–4)
ETHANOL PERCENT: <0.01 %
ETHANOL: <10 MG/DL
FENTANYL URINE: NEGATIVE
GFR SERPL CREATININE-BSD FRML MDRD: >60 ML/MIN/1.73M2
GLUCOSE SERPL-MCNC: 101 MG/DL (ref 70–99)
HCT VFR BLD AUTO: 46.7 % (ref 41–53)
HGB BLD-MCNC: 16.1 G/DL (ref 13.5–17.5)
LYMPHOCYTES # BLD: 25 % (ref 24–44)
MAGNESIUM SERPL-MCNC: 2.2 MG/DL (ref 1.6–2.6)
MCH RBC QN AUTO: 29.2 PG (ref 26–34)
MCHC RBC AUTO-ENTMCNC: 34.4 G/DL (ref 31–37)
MCV RBC AUTO: 84.9 FL (ref 80–100)
METHADONE SCREEN, URINE: NEGATIVE
MONOCYTES # BLD: 9 % (ref 1–7)
OPIATES, URINE: NEGATIVE
OXYCODONE SCREEN URINE: NEGATIVE
PDW BLD-RTO: 12.9 % (ref 11.5–14.9)
PHENCYCLIDINE, URINE: NEGATIVE
PLATELET # BLD AUTO: 315 K/UL (ref 150–450)
PMV BLD AUTO: 7.1 FL (ref 6–12)
POTASSIUM SERPL-SCNC: 4.2 MMOL/L (ref 3.7–5.3)
PROT SERPL-MCNC: 8.3 G/DL (ref 6.4–8.3)
RBC # BLD: 5.51 M/UL (ref 4.5–5.9)
SALICYLATE LEVEL: <1 MG/DL (ref 3–10)
SEG NEUTROPHILS: 62 % (ref 36–66)
SEGMENTED NEUTROPHILS ABSOLUTE COUNT: 5 K/UL (ref 1.3–9.1)
SODIUM SERPL-SCNC: 137 MMOL/L (ref 135–144)
TEST INFORMATION: NORMAL
WBC # BLD AUTO: 8.2 K/UL (ref 3.5–11)

## 2023-05-10 PROCEDURE — 80307 DRUG TEST PRSMV CHEM ANLYZR: CPT

## 2023-05-10 PROCEDURE — 83735 ASSAY OF MAGNESIUM: CPT

## 2023-05-10 PROCEDURE — G0480 DRUG TEST DEF 1-7 CLASSES: HCPCS

## 2023-05-10 PROCEDURE — 80179 DRUG ASSAY SALICYLATE: CPT

## 2023-05-10 PROCEDURE — 85025 COMPLETE CBC W/AUTO DIFF WBC: CPT

## 2023-05-10 PROCEDURE — 80143 DRUG ASSAY ACETAMINOPHEN: CPT

## 2023-05-10 PROCEDURE — 99285 EMERGENCY DEPT VISIT HI MDM: CPT

## 2023-05-10 PROCEDURE — 80053 COMPREHEN METABOLIC PANEL: CPT

## 2023-05-10 PROCEDURE — 36415 COLL VENOUS BLD VENIPUNCTURE: CPT

## 2023-05-10 PROCEDURE — 1240000000 HC EMOTIONAL WELLNESS R&B

## 2023-05-10 RX ORDER — MAGNESIUM HYDROXIDE/ALUMINUM HYDROXICE/SIMETHICONE 120; 1200; 1200 MG/30ML; MG/30ML; MG/30ML
30 SUSPENSION ORAL EVERY 6 HOURS PRN
Status: DISCONTINUED | OUTPATIENT
Start: 2023-05-10 | End: 2023-05-13 | Stop reason: HOSPADM

## 2023-05-10 RX ORDER — TRAZODONE HYDROCHLORIDE 50 MG/1
50 TABLET ORAL NIGHTLY PRN
Status: DISCONTINUED | OUTPATIENT
Start: 2023-05-11 | End: 2023-05-13 | Stop reason: HOSPADM

## 2023-05-10 RX ORDER — ACETAMINOPHEN 325 MG/1
650 TABLET ORAL EVERY 4 HOURS PRN
Status: DISCONTINUED | OUTPATIENT
Start: 2023-05-10 | End: 2023-05-13 | Stop reason: HOSPADM

## 2023-05-10 RX ORDER — IBUPROFEN 400 MG/1
400 TABLET ORAL EVERY 6 HOURS PRN
Status: DISCONTINUED | OUTPATIENT
Start: 2023-05-10 | End: 2023-05-13 | Stop reason: HOSPADM

## 2023-05-10 RX ORDER — HYDROXYZINE 50 MG/1
50 TABLET, FILM COATED ORAL 3 TIMES DAILY PRN
Status: DISCONTINUED | OUTPATIENT
Start: 2023-05-10 | End: 2023-05-13 | Stop reason: HOSPADM

## 2023-05-10 RX ORDER — POLYETHYLENE GLYCOL 3350 17 G/17G
17 POWDER, FOR SOLUTION ORAL DAILY PRN
Status: DISCONTINUED | OUTPATIENT
Start: 2023-05-10 | End: 2023-05-13 | Stop reason: HOSPADM

## 2023-05-10 ASSESSMENT — LIFESTYLE VARIABLES
HOW OFTEN DO YOU HAVE A DRINK CONTAINING ALCOHOL: NEVER
HOW OFTEN DO YOU HAVE A DRINK CONTAINING ALCOHOL: NEVER
HOW MANY STANDARD DRINKS CONTAINING ALCOHOL DO YOU HAVE ON A TYPICAL DAY: PATIENT DOES NOT DRINK
HOW MANY STANDARD DRINKS CONTAINING ALCOHOL DO YOU HAVE ON A TYPICAL DAY: PATIENT DOES NOT DRINK

## 2023-05-10 ASSESSMENT — SLEEP AND FATIGUE QUESTIONNAIRES
AVERAGE NUMBER OF SLEEP HOURS: 5
DO YOU USE A SLEEP AID: NO
DO YOU HAVE DIFFICULTY SLEEPING: YES
SLEEP PATTERN: DIFFICULTY FALLING ASLEEP;DISTURBED/INTERRUPTED SLEEP

## 2023-05-10 ASSESSMENT — PATIENT HEALTH QUESTIONNAIRE - PHQ9: SUM OF ALL RESPONSES TO PHQ QUESTIONS 1-9: 18

## 2023-05-10 ASSESSMENT — PAIN - FUNCTIONAL ASSESSMENT: PAIN_FUNCTIONAL_ASSESSMENT: NONE - DENIES PAIN

## 2023-05-11 PROBLEM — R45.851 SUICIDAL IDEATION: Status: ACTIVE | Noted: 2023-05-11

## 2023-05-11 PROCEDURE — 99222 1ST HOSP IP/OBS MODERATE 55: CPT | Performed by: INTERNAL MEDICINE

## 2023-05-11 PROCEDURE — 1240000000 HC EMOTIONAL WELLNESS R&B

## 2023-05-11 PROCEDURE — 6370000000 HC RX 637 (ALT 250 FOR IP): Performed by: NURSE PRACTITIONER

## 2023-05-11 RX ORDER — ATOMOXETINE 40 MG/1
80 CAPSULE ORAL DAILY
Status: DISCONTINUED | OUTPATIENT
Start: 2023-05-11 | End: 2023-05-13 | Stop reason: HOSPADM

## 2023-05-11 RX ORDER — SERTRALINE HYDROCHLORIDE 25 MG/1
25 TABLET, FILM COATED ORAL DAILY
Status: CANCELLED | OUTPATIENT
Start: 2023-05-11

## 2023-05-11 RX ORDER — ATOMOXETINE 80 MG/1
80 CAPSULE ORAL DAILY
COMMUNITY

## 2023-05-11 RX ORDER — ARIPIPRAZOLE LAUROXIL 1064 MG/3.9ML
1064 INJECTION, SUSPENSION, EXTENDED RELEASE INTRAMUSCULAR
COMMUNITY

## 2023-05-11 RX ORDER — SERTRALINE HYDROCHLORIDE 25 MG/1
25 TABLET, FILM COATED ORAL DAILY
Status: DISCONTINUED | OUTPATIENT
Start: 2023-05-11 | End: 2023-05-13 | Stop reason: HOSPADM

## 2023-05-11 RX ORDER — ARIPIPRAZOLE 10 MG/1
10 TABLET ORAL DAILY
Status: DISCONTINUED | OUTPATIENT
Start: 2023-05-11 | End: 2023-05-13 | Stop reason: HOSPADM

## 2023-05-11 RX ADMIN — ARIPIPRAZOLE 10 MG: 10 TABLET ORAL at 13:06

## 2023-05-11 RX ADMIN — SERTRALINE 25 MG: 25 TABLET, FILM COATED ORAL at 13:06

## 2023-05-11 RX ADMIN — HYDROXYZINE HYDROCHLORIDE 50 MG: 50 TABLET, FILM COATED ORAL at 08:55

## 2023-05-11 RX ADMIN — ATOMOXETINE 80 MG: 40 CAPSULE ORAL at 13:06

## 2023-05-11 NOTE — CARE COORDINATION
BHI Biopsychosocial Assessment    Current Level of Psychosocial Functioning     Independent   Dependent    Minimal Assist X    Psychosocial High Risk Factors (check all that apply)    Unable to obtain meds   Chronic illness/pain    Substance abuse   Lack of Family Support   Financial stress   Isolation X  Inadequate Community Resources  Suicide attempt(s)   Not taking medications   Victim of crime   Developmental Delay  Unable to manage personal needs  X  Age 72 or older   Homeless  No transportation   Readmission within 30 days  Unemployment  Traumatic Event    Psychiatric Advanced Directives: none reported     Family to Involve in Treatment: Pt father Sascha Hall is supportive and involved in his care. Sexual Orientation:  GERARD    Patient Strengths:adequate housing, family support, linked with [facility], insurance, employed     Patient Barriers: presenting on admission with suicidal ideations, past history of a suicide attempt, lack of coping skills. Opiate Education Provided: N/A Pt denies and does not have a documented history of Opiate or Heroin use/abuse. Pt denies any Alcohol or Illicit drug use and his drug screen was negative for all substances upon admission. CMHC/mental health history: Deaf, Fluent in ASL-Needs  Linked with [facility], Stable housing in [place] with his father, Increase in Depression, SI    Plan of Care   medication management, group/individual therapies, family meetings, psycho -education, treatment team meetings to assist with stabilization    Initial Discharge Plan:  Patient has a plan to return to live with his father in [place]. Patient also reports a plan to continue with outpatient Treatment at the [facility]. Clinical Summary:  Patient is a 33year old  male who presented on admission with suicidal ideation. Assessment was completed using  service, Patient is deaf and communicates by using American Sign Language.   Patient

## 2023-05-11 NOTE — PROGRESS NOTES
Behavioral Services  Medicare Certification Upon Admission    I certify that this patient's inpatient psychiatric hospital admission is medically necessary for:    [x] (1) Treatment which could reasonably be expected to improve this patient's condition,       [x] (2) Or for diagnostic study;     AND     [x](2) The inpatient psychiatric services are provided while the individual is under the care of a physician and are included in the individualized plan of care.     Estimated length of stay/service 2-9 days    Plan for post-hospital care -outpatient care    Electronically signed by Pa Eagle MD on 5/11/2023 at 9:26 AM

## 2023-05-11 NOTE — ED NOTES
arrived in [facility] AN AFFILIATE OF [facility] @ [facility] Ames [address] [place] [address]  97/72/87 0571
Email sent to [facility] language service and [facility]  service      Anitha Erwin [facility]  05/10/23 2051
Report called to [facility] - they were informed interpretor is still present to assist with intake process.
[facility] interpreting [phone] called after calling  Vs house supervisor for phone number for ASL in person service will locate person ASAP for ALS interpreting  bryan Fields RN  05/10/23 [address], RN  05/10/23 2857
Safeguard in [facility] AN AFFILIATE OF [facility] for patient watch. Safeguard informed that they need to stay with the patient at all time, must be present in the room and if they need a break or relief to let the nurse know so they can be replaced. Safeguard verbalizes understanding. Belongings and patient checked by security. Belongings locked up. Pt in blue gown.        Gela Medina LPN  41/05/74 2046
Update from [facility] that   Parris Cody is on way in     [address] [place] [address]  05/10/23 6445
after     Nedra Kaminski, RN  05/10/23 7042
hospitalization for safety and stabilization. Patient signed application for voluntary admission to [facility].

## 2023-05-11 NOTE — H&P
Department of Psychiatry  Attending Physician Psychiatric Assessment     Reason for Admission to Psychiatric Unit:  A mental disorder causing major disability in social, interpersonal, occupational, and/or educational functioning that is leading to dangerous or life-threatening functioning, and that can only be addressed in an acute inpatient setting   Concerns about patient's safety in the community    CHIEF COMPLAINT: Depression with suicidal ideation    History obtained from: Patient, electronic medical record          HISTORY OF PRESENT ILLNESS:    Diego Sanchez is a 33 y.o. male who has a past medical history of mental illness. Patient presented to the ED via law enforcement after experiencing intense suicidal ideation while at work. Patient was previously admitted to [facility] in 2012. Patient was seen for initial evaluation today. He is hearing impaired and communicates via ASL.  was utilized via  technology with  present. Patient reports a history of depression with last serious depressive episode in 2017. He stayed inpatient at [facility] at that time. He reports a prior diagnosis of bipolar disorder. Patient is currently linked with [facility] where he receives Aristada 1064 every 2 months. He is also currently taking oral Abilify and Strattera. Patient reports that everything was going fine yesterday but something triggered a \"flashback\" and he began having very intense feelings of sadness with suicidal ideation to shoot himself. Patient does not wish to discuss what he had a flashback about. He denies any specific triggers but states that the month of May in general is a trigger for him due to the ending of a serious relationship and his mother passing away. He seems to struggle yearly during the month of May.   Since admission to this facility thoughts of suicide have improved and he is no longer having intrusive and frequent thoughts of harming
injection Inject 3.9 mLs into the muscle Every 2 months   Yes Historical Provider, MD   ARIPiprazole (ABILIFY) 10 MG tablet Take 1 tablet by mouth daily    Historical Provider, MD        Allergies:     Patient has no known allergies. Social History:     Tobacco:    reports that he has never smoked. He has never used smokeless tobacco.  Alcohol:      reports no history of alcohol use. Drug Use:  has no history on file for drug use. Family History:     History reviewed. No pertinent family history. Review of Systems:     Positive and Negative as described in HPI. Patient is deaf and mute  CONSTITUTIONAL:  negative for fevers, chills, sweats, fatigue, weight loss  HEENT:  negative for vision, hearing changes, runny nose, throat pain  RESPIRATORY:  negative for shortness of breath, cough, congestion, wheezing  CARDIOVASCULAR:  negative for chest pain, palpitations  GASTROINTESTINAL:  negative for nausea, vomiting, diarrhea, constipation, change in bowel habits, abdominal pain   GENITOURINARY:  negative for difficulty of urination, burning with urination, frequency   INTEGUMENT:  negative for rash, skin lesions, easy bruising   HEMATOLOGIC/LYMPHATIC:  negative for swelling/edema   ALLERGIC/IMMUNOLOGIC:  negative for urticaria , itching  ENDOCRINE:  negative increase in drinking, increase in urination, hot or cold intolerance  MUSCULOSKELETAL:  negative joint pains, muscle aches, swelling of joints  NEUROLOGICAL:  negative for headaches, dizziness, lightheadedness, numbness, pain, tingling extremities      Physical Exam:   BP (!) 118/53   Pulse 63   Temp 99.3 °F (37.4 °C)   Resp 14   Ht 6' 1\" (1.854 m)   Wt 200 lb (90.7 kg)   SpO2 96%   BMI 26.39 kg/m²   Temp (24hrs), Av.3 °F (36.8 °C), Min:97.7 °F (36.5 °C), Max:99.3 °F (37.4 °C)    No results for input(s): POCGLU in the last 72 hours.   No intake or output data in the 24 hours ending 23 1635  Patient is deaf and mute  General Appearance:

## 2023-05-11 NOTE — GROUP NOTE
Group Therapy Note    Date: 5/11/2023    Group Start Time: 1292  Group End Time: 1549  Group Topic: Cognitive Skills    [facility] D    WADE AdamS        Group Therapy Note    Attendees: 12/20     Topic: To increase social interaction, problem solving, and communication skills     Patient did not participate in Cognitive Skills Group at 14:30, pt was sleeping soundly in bed, eyes closed and deep breathing. Pt did not wake when RT tapped on mattress near head to create vibration, and then gently tapped arm. Q15 minute safety checks maintained for patient safety and will continue to encourage patient to   attend unit programming.            Discipline Responsible: Psychoeducational Specialist        Signature:  Aman Newberry

## 2023-05-11 NOTE — PROGRESS NOTES
Pharmacy Medication History Note      List of current medications patient is taking is complete. Source of information: Elkolilia Martin (Vamshi Jay), [facility]), [id number] [facility], [address] [place] [address] made to medication list:  Medications removed (include reason, ex. therapy complete or physician discontinued, noncompliance):  Famotidine (therapy completed) - 7 day course in March 2023    Medications added/doses adjusted: Added Atomoxetine 80 mg daily  Adjusted Aripiprazole to 10 mg daily  Added Aristada 1064 mg every 2 months    Other notes (ex. Recent course of antibiotics, Coumadin dosing):  Per the [facility], the patient's last Aristada 1064 mg injection was on 4/7/23. Current Home Medication List at Time of Admission:  Prior to Admission medications    Medication Sig   atomoxetine (STRATTERA) 80 MG capsule Take 1 capsule by mouth daily   ARIPiprazole Lauroxil (ARISTADA) 1064 MG/3.9ML PRSY injection Inject 3.9 mLs into the muscle Every 2 months   ARIPiprazole (ABILIFY) 10 MG tablet Take 1 tablet by mouth daily         Please let me know if you have any questions about this encounter. Thank you!     Electronically signed by Wing Young [facility] on 5/11/2023 at 9:05 AM

## 2023-05-11 NOTE — ED PROVIDER NOTES
ADDENDUM:        Care of this patient was assumed from Dr. Blanka Erwin    at   2030   . The patient was seen for Suicidal  .  The patient's initial evaluation and plan have been discussed with the prior provider who initially evaluated the patient. Nursing Notes, Past Medical Hx, Past Surgical Hx, Social Hx, Allergies, and Family Hx were all reviewed. I performed a repeat evaluation of the patient and reviewed tests completed so far. Brought in by police for suicidal ideation but we have not been able to get ASL  yet    Medically cleared    ED Course       arrived and confirmed story suicidal ideation with plan to shoot himself. No medical complaints. No AVH. Will admit to psych    The patient was given the following medications:  No orders of the defined types were placed in this encounter. RECENT VITALS:  BP: 118/82, Temp: 97.7 °F (36.5 °C), Pulse: 79, Respirations: 18     RADIOLOGY:All plain film, CT, MRI, and formal ultrasound images (except ED bedside ultrasound) are read by the radiologist and the images and interpretations are directly viewed by the emergency physician. No orders to display         LABS: All lab results were reviewed by myself, and all abnormals are listed below.   Labs Reviewed   CBC WITH AUTO DIFFERENTIAL - Abnormal; Notable for the following components:       Result Value    Monocytes 9 (*)     All other components within normal limits   COMPREHENSIVE METABOLIC PANEL - Abnormal; Notable for the following components:    Glucose 101 (*)     ALT 44 (*)     All other components within normal limits   SALICYLATE LEVEL - Abnormal; Notable for the following components:    Salicylate Lvl <1 (*)     All other components within normal limits   ACETAMINOPHEN LEVEL - Abnormal; Notable for the following components:    Acetaminophen Level <5 (*)     All other components within normal limits   ETHANOL   MAGNESIUM   URINE DRUG SCREEN           Disposition   DISPOSITION: Caller requesting status of Hospice Order faxed to our clinic and requesting them to be faxed back to [phone].

## 2023-05-11 NOTE — GROUP NOTE
Group Therapy Note    Date: 5/11/2023    Group Start Time: 1100  Group End Time: 9203  Group Topic: Cognitive Skills    [facility]    Janae Mcgovern, [place]        Group Therapy Note    Attendees: 10/21       Comments:     Topic: To increase social interaction, decision making, and communication skills     Patient did not participate in Cognitive Skills Group at 11:00, pt was sleeping soundly in bed, eyes closed and deep breathing. Pt did not wake when RT tapped on mattress near head to create vibration, and then gently tapped arm. Q15 minute safety checks maintained for patient safety and will continue to encourage patient to   attend unit programming.            Discipline Responsible: Psychoeducational Specialist        Signature:  Luz Marina Ramos

## 2023-05-11 NOTE — PLAN OF CARE
Patient was overheard whimpering in room. Writer approaches patient  and gives patient pencil and paper to communicate needs- patient is deaf. Patient writes the following- \" I tried to resist suicide thoughts, but I almost almost made! \" Writer corresponds with patient asking if he is currently has a plan and if he is able to contract to safety on the unit- patient does not have a plan and contracts to safety. Patient is depressed and anxious and accepted Atarax 50 mg oral PRN for anxiety. Patient denies pain and denies all hallucinations. Every 15 minute safety checks maintain. Will continue to monitor and provide support. Video Remote Interpretor on unit for patient to use. Problem: Self Harm/Suicidality  Goal: Will have no self-injury during hospital stay  Description: INTERVENTIONS:  1. Ensure constant observer at bedside with Q15M safety checks  2. Maintain a safe environment  3. Secure patient belongings  4. Ensure family/visitors adhere to safety recommendations  5. Ensure safety tray has been added to patient's diet order  6. Every shift and PRN: Re-assess suicidal risk via Frequent Screener    5/11/2023 0933 by Victor M Ernandez LPN  Outcome: Progressing     Problem: Depression  Goal: Will be euthymic at discharge  Description: INTERVENTIONS:  1. Administer medication as ordered  2. Provide emotional support via 1:1 interaction with staff  3. Encourage involvement in milieu/groups/activities  4.  Monitor for social isolation  5/11/2023 0933 by Victor M Ernandez LPN  Outcome: Progressing     Problem: Safety - Adult  Goal: Ability to express needs and understand communication  5/11/2023 0933 by Victor M Ernandez LPN  Outcome: Progressing

## 2023-05-12 PROCEDURE — 1240000000 HC EMOTIONAL WELLNESS R&B

## 2023-05-12 PROCEDURE — 6370000000 HC RX 637 (ALT 250 FOR IP): Performed by: NURSE PRACTITIONER

## 2023-05-12 RX ORDER — SERTRALINE HYDROCHLORIDE 25 MG/1
25 TABLET, FILM COATED ORAL DAILY
Qty: 30 TABLET | Refills: 0 | Status: SHIPPED | OUTPATIENT
Start: 2023-05-13

## 2023-05-12 RX ORDER — HYDROXYZINE 50 MG/1
50 TABLET, FILM COATED ORAL 3 TIMES DAILY PRN
Qty: 30 TABLET | Refills: 0 | Status: SHIPPED | OUTPATIENT
Start: 2023-05-12 | End: 2023-05-22

## 2023-05-12 RX ADMIN — ARIPIPRAZOLE 10 MG: 10 TABLET ORAL at 08:27

## 2023-05-12 RX ADMIN — ATOMOXETINE 80 MG: 40 CAPSULE ORAL at 08:27

## 2023-05-12 RX ADMIN — SERTRALINE 25 MG: 25 TABLET, FILM COATED ORAL at 08:27

## 2023-05-12 NOTE — GROUP NOTE
Group Therapy Note    Date: 5/12/2023    Group Start Time: 1440  Group End Time: 3490  Group Topic: Cognitive Skills    ELISA Pichardo        Group Therapy Note    Attendees: 8/18     Patient's Goal: To increase social interaction, explore sources of anxiety and identify positive tools/resources to limit, redirect, cope with anxiety, communication skills      Notes:  Pt was pleasant and cooperative. Pt was able to explore sources of anxiety and identify positive tools/resources to limit, redirect, cope with anxiety, communication skills using creative expression and discussion using . Pt explored and shared group topic individually using , and attended to discussion with RT and peers utilizing . Pt was supportive of peers and able to relate to some of peers feelings and experiences. Status After Intervention:  Improved     Participation Level: Actively attended using  ,interactive,supportive     Participation Quality: Appropriate, Attentive ,interactive, supportive        Speech:  Pt uses ASL to communicate. Thought Process/Content: Logical r/t task, and topic. Pt was confident and very articulate in his expression of topic using art work and writing which he then communicated using  to group. Pt was able to relate to peers/discussion using  and gestured/nodded frequently when others were sharing, RT was presenting information. Affective Functioning: Congruent, brightened        Mood: Euthymic, pt was confident and hopeful in his expression of coping. Pt was appreciative of posditive feedback from peers and RT and shared positive feedback with peers using .         Level of consciousness:  Alert, and Attentive      Response to Learning: Attentive , able to express current knowledge /experience using ASL, able to

## 2023-05-12 NOTE — GROUP NOTE
Group Therapy Note    Date: 5/12/2023    Group Start Time: 1100  Group End Time: 1200  Group Topic: Cognitive Skills    SANTIAGO Aponte, WADES        Group Therapy Note    Attendees: 13/20       Patient's Goal: To increase social interaction, practice decision making skills, communication skills      Notes:  Pt was pleasant and cooperative. Pt was able to practice decision making skills in task independently ,and  communication skills both with assistance of ASL on line . Pt shared with patients and was able to enjoy humor via . At end of group RT provided opportunity for pt to socialize with peers using . Pt was confident in expressing himself and sharing his interests and passions. Pt could relate to some of peers' and their interests and emotions leading to admission in a natural friendship based conversation. Status After Intervention:  Improved     Participation Level: Actively attends to, and engages in task/topic utilizing  ,interactive using ,supportive     Participation Quality: Appropriate, Attentive ,interactive, supportive        Speech:  Pt uses ASL to communicate and utilizes  to communicate with others. Thought Process/Content: Logical r/t task, and topic. Pt identified he can hear very loud sounds that travel far, and that he can feel vibration of those sounds, some loud music, movement.       Affective Functioning: Congruent, brightened especially with humor        Mood: Euthymic, able to express enjoyment of his interests and smiled with humor        Level of consciousness:  Alert, and Attentive      Response to Learning: Attentive , able to express through ASL current knowledge /experience, able to express through ASL/acknowledge new learning, and Progressing to goal        Endings: None Reported     Modes of Intervention: Education,

## 2023-05-12 NOTE — PLAN OF CARE
Problem: Self Harm/Suicidality  Goal: Will have no self-injury during hospital stay  Description: INTERVENTIONS:  1. Ensure constant observer at bedside with Q15M safety checks  2. Maintain a safe environment  3. Secure patient belongings  4. Ensure family/visitors adhere to safety recommendations  5. Ensure safety tray has been added to patient's diet order  6. Every shift and PRN: Re-assess suicidal risk via Frequent Screener    Outcome: Progressing     Problem: Depression  Goal: Will be euthymic at discharge  Description: INTERVENTIONS:  1. Administer medication as ordered  2. Provide emotional support via 1:1 interaction with staff  3. Encourage involvement in milieu/groups/activities  4. Monitor for social isolation  Outcome: Progressing   Patient verbalized fleeting suicidal ideas at this time. Patient denies homicidal ideas at this time. Patient verbalized depressive symptoms at this time. Patient has been in room. Patient is free of self harm at this time. Patient agrees to seek out staff if thoughts to harm self arise. Staff will provide support and reassurance as needed. Safety checks maintained every 15 minutes.

## 2023-05-12 NOTE — CARE COORDINATION
Social work met with patient to discuss his preferences for communicating while inpatient. Patient stated he prefers to have someone in person to sign for him so he can participate better in groups, with staff, and other patients. He states in the meantime he will be fine using the [employer] interpreting device. Social work calls [employer] [phone] to schedule. They advise that there is a 48 hour lead time for american sign language interpreters and that they will submit the request for as soon as possible beginning today. All communication with patient during this one on one time was completed with use of [employer] assistive communication device to accommodate patient's needs.

## 2023-05-12 NOTE — CONSULTS
Session ID: [id number]  Request [id number]  Language:ASL  Status:Fulfilled  Agent NH:#[id number]  OZRMP GRANT:[id number]

## 2023-05-12 NOTE — PLAN OF CARE
Problem: Self Harm/Suicidality  Goal: Will have no self-injury during hospital stay  Description: INTERVENTIONS:  1. Ensure constant observer at bedside with Q15M safety checks  2. Maintain a safe environment  3. Secure patient belongings  4. Ensure family/visitors adhere to safety recommendations  5. Ensure safety tray has been added to patient's diet order  6. Every shift and PRN: Re-assess suicidal risk via Frequent Screener    Outcome: Progressing     Problem: Depression  Goal: Will be euthymic at discharge  Description: INTERVENTIONS:  1. Administer medication as ordered  2. Provide emotional support via 1:1 interaction with staff  3. Encourage involvement in milieu/groups/activities  4. Monitor for social isolation  Outcome: Progressing    Patient denies depression, anxiety, suicidal / homicidal ideation and hallucinations. Patient is non verbal , but is able to read and write during assessment. Writer notes patient becoming anxious during the middle of shift by making grunting loudly and pacing, patient communicates to writer it is due to him not being able to play with legos which he says is his favorite hobby. When writer offered him to do other activities or to be given medication to help with the anxiety patient refused. Patient has been attending groups, seen writing notes with peers at times , cooperative, medication compliant, behavior controled.

## 2023-05-12 NOTE — CONSULTS
Session ID: [id number]  Request [id number]  Language:ASL  Status:Fulfilled  Agent XC:#[id number]  Agent Name:Ismael

## 2023-05-12 NOTE — CONSULTS
Session ID: [id number]  Request [id number]  Language:ASL  Status:Fulfilled  Agent LE:#[id number]  Agent Name:Perla

## 2023-05-12 NOTE — CARE COORDINATION
Social work was able to secure in person  for patient today. Also requested for  tomorrow from [address] [place] [address]- noon.

## 2023-05-12 NOTE — PROGRESS NOTES
Daily Progress Note  5/12/2023    Patient Name: Ivon Ag    CHIEF COMPLAINT:  Depression with suicidal ideation          SUBJECTIVE:    Josey Home has been compliant with scheduled medications. He has not required any emergency medications in the last 24 hours. Nursing staff reports patient has presented as anxious and is frequently observed to be pacing the unit and grunting loudly due to frustration. He has been focused on discharge and asking to leave. He has been attending groups and socializing with peers appropriately. Patient was agreeable to conversation in privacy of unit sensory room. Interview conducted with use of  via iPad. Patient is reporting today that suicidal ideation has improved and he is not having thoughts of wanting to end his life. He described his mood as \"happy\" but he seems frustrated and angry when signing his responses regarding his admission. He states that he never wants to return to this facility again because he was unable to leave at [facility] and he misses doing his favorite hobbies such as [address] He denies any side effects from commencement of Zoloft and would like to keep taking it until he can get his next Aristada injection on June 7. Writer offers to change frequency of Aristada to monthly as he reports his mood tends to worsen the closer he gets to his injection. He declines this offer and would like to maintain the Aristada 1064 bimonthly. He denies homicidal ideation. He is not experiencing any auditory or visual hallucinations. Writer asks patient to identify safety plan once he is home if thoughts of suicide return. Patient reports he will seek help from coworkers or his father or call [phone]. Patient feels he is now able to contract for safety in the community and feels ready for discharge.     Appetite:  [x] Adequate/Unchanged  [] Increased  [] Decreased      Sleep:       [x] Adequate/Unchanged  [] Fair  [] Poor

## 2023-05-12 NOTE — GROUP NOTE
Group Therapy Note    Date: 5/12/2023    Group Start Time: 0930  Group End Time: 1000  Group Topic: Community Meeting    SANTIAGO Ibanez RN        Group Therapy Note    Attendees: 11/21       Patient's Goal:  Goal group     Notes:  Patient did not participate    Status After Intervention:  Unchanged    Participation Level: None    Participation Quality: Appropriate      Speech:  normal      Thought Process/Content: Logical      Affective Functioning: Congruent      Mood: euthymic      Level of consciousness:  Alert      Response to Learning: Able to verbalize current knowledge/experience      Endings: None Reported    Modes of Intervention: Education      Discipline Responsible: Registered Nurse      Signature:  Flor Ibanez RN

## 2023-05-12 NOTE — CONSULTS
Session ID: [id number]  Request [id number]  Language:ASL  Status:Fulfilled  Agent PA:#[id number]  Agent Name:ePg

## 2023-05-12 NOTE — PROGRESS NOTES
CLINICAL PHARMACY NOTE: MEDS TO BEDS    Total # of Prescriptions Filled: 2   The following medications were delivered to the patient:  Hydroxyzine HCL 50mg  Sertraline HCL 25mg    Additional Documentation:  Delivered medications to Christin at nurses station 4:18pm 5/12/23 DP

## 2023-05-13 VITALS
BODY MASS INDEX: 26.51 KG/M2 | SYSTOLIC BLOOD PRESSURE: 122 MMHG | TEMPERATURE: 97.9 F | HEART RATE: 59 BPM | HEIGHT: 73 IN | OXYGEN SATURATION: 96 % | DIASTOLIC BLOOD PRESSURE: 65 MMHG | WEIGHT: 200 LBS | RESPIRATION RATE: 14 BRPM

## 2023-05-13 LAB
CHOLEST SERPL-MCNC: 224 MG/DL
CHOLESTEROL/HDL RATIO: 4.4
HDLC SERPL-MCNC: 51 MG/DL
LDLC SERPL CALC-MCNC: 156 MG/DL (ref 0–130)
TRIGL SERPL-MCNC: 86 MG/DL

## 2023-05-13 PROCEDURE — 80061 LIPID PANEL: CPT

## 2023-05-13 PROCEDURE — 83036 HEMOGLOBIN GLYCOSYLATED A1C: CPT

## 2023-05-13 PROCEDURE — 36415 COLL VENOUS BLD VENIPUNCTURE: CPT

## 2023-05-13 PROCEDURE — 6370000000 HC RX 637 (ALT 250 FOR IP): Performed by: NURSE PRACTITIONER

## 2023-05-13 RX ADMIN — SERTRALINE 25 MG: 25 TABLET, FILM COATED ORAL at 07:54

## 2023-05-13 RX ADMIN — ARIPIPRAZOLE 10 MG: 10 TABLET ORAL at 07:54

## 2023-05-13 RX ADMIN — ATOMOXETINE 80 MG: 40 CAPSULE ORAL at 07:54

## 2023-05-13 NOTE — DISCHARGE INSTRUCTIONS
Information:  Medications:   Medication summary provided   I understand that I should take only the medications on my list.     -why and when I need to take each medicine.     -which side effects to watch for.     -that I should carry my medication information at all times in case of     Emergency situations. I will take all of my medicines to follow up appointments.     -check with my physician or pharmacist before taking any new    Medication, over the counter product or drink alcohol.    -Ask about food, drug or dietary supplement interactions.    -discard old lists and update records with medication providers. Notify Physician:  Notify physician if you notice:   Always call 911 if you feel your life is in danger  In case of an emergency call 911 immediately! If 911 is not available call your local emergency medical system for help    Behavioral Health Follow Up:  Original Referral Source: [facility]  Discharge Diagnosis: Suicidal ideation [R45.851]  Depression with suicidal ideation [F32. A, R45.851]  Recommendations for Level of Care: Follow up  Patient status at discharge: Stable  [facility] hospital  [facility]  Aftercare plan faxed: ***   -faxed by: [facility]   -date: 5/13/23   -time: 1200  Prescriptions: [facility]    Smoking: Quit Smoking. Call the [employer]'s smoking quitline at 2-217-35I-QUIT  Know the signs of a heart attack   If you have any of the following symptoms call 911 immediately, do not wait more    Than five minutes. 1. Pressure, fullness and/ or squeezing in the center of the chest spreading to    The jaw, neck or shoulder. 2. Chest discomfort with light headedness, fainting, sweating, nausea or    Shortness of breath. 3. Upper abdominal pressure or discomfort. 4. Lower chest pain, back pain, unusual fatigue, shortness of breath, nausea   Or dizziness.      General Information:   Questions regarding your bill: Call HELP program [phone]     Suicide Hotline (Anthonette Krabbe

## 2023-05-13 NOTE — GROUP NOTE
Group Therapy Note    Date: 5/12/2023    Group Start Time: 2005  Group End Time: 2115  Group Topic: Scientology Group    SANTIAGO Pavon        Group Therapy Note    Attendees: 10/18         Status After Intervention:  Improved    Participation Level:  Active Listener    Participation Quality: Appropriate      Speech:  normal      Thought Process/Content: Logical      Affective Functioning: Congruent      Mood: elevated      Level of consciousness:  Alert      Response to Learning: Able to verbalize current knowledge/experience      Endings: None Reported    Modes of Intervention: Socialization      Discipline Responsible: Behavorial Health Tech      Signature:  Ashauntis Lorrane Alpers

## 2023-05-13 NOTE — DISCHARGE SUMMARY
Provider Discharge Summary     Patient ID:  Sheliah Kayser  [id number]  65 y.o.  1990    Admit date: 5/10/2023    Discharge date and time: 5/13/2023  6:12 PM     Admitting Physician: Christian Montague MD     Discharge Physician: SIMON Bauman CNP    Admission Diagnoses: Suicidal ideation [R45.851]  Depression with suicidal ideation [F32. A, R45.851]    Discharge Diagnoses:   Depression with suicidal ideation     Patient Active Problem List   Diagnosis Code    Headache, unspecified headache type R51.9    Depression with suicidal ideation F32. A, R45.851    Suicidal ideation R45.851        Admission Condition: poor    Discharged Condition: stable    Indication for Admission: threat to self    History of Present Illnes (present tense wording is of findings from admission exam and are not necessarily indicative of current findings):   Sheliah Kayser is a 33 y.o. male who has a past medical history of mental illness. Patient presented to the ED via law enforcement after experiencing intense suicidal ideation while at work. Patient was previously admitted to [facility] in 2012. Patient was seen for initial evaluation today. He is hearing impaired and communicates via ASL.  was utilized via  technology with  present. Patient reports a history of depression with last serious depressive episode in 2017. He stayed inpatient at [facility] at that time. He reports a prior diagnosis of bipolar disorder. Patient is currently linked with [facility] where he receives Aristada 1064 every 2 months. He is also currently taking oral Abilify and Strattera. Patient reports that everything was going fine yesterday but something triggered a \"flashback\" and he began having very intense feelings of sadness with suicidal ideation to shoot himself. Patient does not wish to discuss what he had a flashback about.   He denies any specific triggers but states that the month of May in general is

## 2023-05-13 NOTE — GROUP NOTE
Group Therapy Note    Date: 5/13/2023    Group Start Time: 0930  Group End Time: 1000  Group Topic: Community Meeting    SANTIAGO Warren LPN        Group Therapy Note    Attendees: 8/18  Patient refused 9am group after staff encouraged to attend.

## 2023-05-13 NOTE — PLAN OF CARE
Problem: Self Harm/Suicidality  Goal: Will have no self-injury during hospital stay  Description: INTERVENTIONS:  1. Ensure constant observer at bedside with Q15M safety checks  2. Maintain a safe environment  3. Secure patient belongings  4. Ensure family/visitors adhere to safety recommendations  5. Ensure safety tray has been added to patient's diet order  6. Every shift and PRN: Re-assess suicidal risk via Frequent Screener    5/12/2023 2045 by Esdras Senior LPN  Outcome: Progressing   Patient denies self harm contract for safety. Patient communicate with  every 15 minute checks continue per facility protocol safe environment maintained. Problem: Depression  Goal: Will be euthymic at discharge  Description: INTERVENTIONS:  1. Administer medication as ordered  2. Provide emotional support via 1:1 interaction with staff  3. Encourage involvement in milieu/groups/activities  4. Monitor for social isolation  5/12/2023 2045 by Esdras Senior LPN  Outcome: Progressing   Patient denies depression medication complaint.

## 2023-05-13 NOTE — BH NOTE
5 [facility]  Initial Interdisciplinary Treatment Plan NO      Original treatment plan Date & Time: 5/11/2023 1302    Admission Type:  Admission Type: Voluntary    Reason for admission:   Reason for Admission: Suicidal thoughts to \"pull the trigger,\" got very depressed at work, hopeless    Estimated Length of Stay:  5-7days  Estimated Discharge Date: to be determined by physician    PATIENT STRENGTHS:  Patient Strengths:   Patient Strengths and Limitations:   Addictive Behavior: Addictive Behavior  In the Past 3 Months, Have You Felt or Has Someone Told You That You Have a Problem With  : None  Medical Problems:  Past Medical History:   Diagnosis Date    ADHD (attention deficit hyperactivity disorder)     Bipolar 1 disorder ([facility] [address]     Deaf     Pt is deaf; Cochlear implant; pt can read and write    Depression      Status EXAM:Mental Status and Behavioral Exam  Normal: No  Level of Assistance: Independent/Self  Facial Expression: Sad  Affect: Appropriate  Level of Consciousness: Alert  Frequency of Checks: 4 times per hour, close  Mood:Normal: No  Mood: Sad, Depressed, Anxious  Motor Activity:Normal: Yes  Eye Contact: Poor  Observed Behavior: Cooperative  Sexual Misconduct History: Current - no  Preception: Ambia to situation, Ambia to place, Ambia to person, Ambia to time  Attention:Normal: No  Attention: Distractible  Thought Processes: Circumstantial  Thought Content:Normal: No  Thought Content: Preoccupations  Depression Symptoms: Feelings of hopelessess, Feelings of helplessness  Anxiety Symptoms: Generalized  Phuong Symptoms: No problems reported or observed.   Hallucinations: None  Delusions: No  Memory:Normal: No  Memory: Poor recent  Insight and Judgment: No  Insight and Judgment: Poor insight, Poor judgment    EDUCATION:   Learner Progress Toward Treatment Goals: reviewed group plans and strategies for care    Method:group therapy, medication compliance, individualized assessments and care
[address] [facility]  Admission Note     Admission Type:   Admission Type: Voluntary    Reason for admission:  Reason for Admission: Suicidal thoughts to \"pull the trigger,\" got very depressed at work, hopeless      Addictive Behavior:   Addictive Behavior  In the Past 3 Months, Have You Felt or Has Someone Told You That You Have a Problem With  : None    Medical Problems:   Past Medical History:   Diagnosis Date    ADHD (attention deficit hyperactivity disorder)     Bipolar 1 disorder ([facility] [address]     Deaf     Pt is deaf; Cochlear implant; pt can read and write    Depression        Status EXAM:  Mental Status and Behavioral Exam  Normal: No  Level of Assistance: Independent/Self  Facial Expression: Worried  Affect: Appropriate  Level of Consciousness: Alert  Frequency of Checks: 4 times per hour, close  Mood:Normal: No  Mood: Depressed, Anxious  Motor Activity:Normal: Yes  Eye Contact: Good  Observed Behavior: Cooperative  Sexual Misconduct History: Current - no  Preception: Manning to person, Manning to time, Manning to place, Manning to situation  Attention:Normal: Yes  Thought Processes: Circumstantial  Thought Content:Normal: No  Thought Content: Preoccupations  Depression Symptoms: Feelings of helplessness, Feelings of hopelessess  Anxiety Symptoms: Generalized  Phuong Symptoms: No problems reported or observed.   Hallucinations: None  Delusions: No  Memory:Normal: Yes  Insight and Judgment: No  Insight and Judgment: Poor judgment, Poor insight    Tobacco Screening:  Practical Counseling, on admission, marce X, if applicable and completed (first 3 are required if patient doesn't refuse):            ( ) Recognizing danger situations (included triggers and roadblocks)                    ( ) Coping skills (new ways to manage stress,relaxation techniques, changing routine, distraction)                                                           ( ) Basic information about quitting (benefits of quitting, techniques in how
On call provider, notified of best practice advisory suggesting to place patient on suicide precautions. Provider to discontinue the order as patient does not meet criteria for suicide precautions at this time. Continue to observe patient on every 15 minute checks.
Patient does not smoke, no tobacco education required at this time.
Patient is an non smoker.
Wrap-Up Group Note        Date: May 10, 2023 Start Time:  2030   End Time:  2130      Number of Participants in [address]:  11/19    Topic: goal review    Goal of Group:communication      Comments:     Patient did not participate in Wrap-Up group, despite staff encouragement and explanation of benefits. Patient remain seclusive to self. Q15 minute safety checks maintained for patient safety and will continue to encourage patient to attend unit programming.
Treatment Center                                                                                                                   ( ) Patient refused counseling  ( ) Patient refused referral  ( ) Patient refused prescription upon discharge  ( X ) Patient has not smoked in the last 30 days    Metabolic Screening:    No results found for: LABA1C    Lab Results   Component Value Date    CHOL 224 (H) 05/13/2023     Lab Results   Component Value Date    TRIG 86 05/13/2023     Lab Results   Component Value Date    HDL 51 05/13/2023     No components found for: LDLCAL  No results found for: Zoraida Hanna LPN     Patient discharged to home address and picked up by family member. Walked out by staff.

## 2023-05-14 LAB
EST. AVERAGE GLUCOSE BLD GHB EST-MCNC: 111 MG/DL
HBA1C MFR BLD: 5.5 % (ref 4–6)

## 2023-05-15 NOTE — CARE COORDINATION
Name: Manisha Villareal    : 1990    Discharge Date: 2023    Primary Auth/Cert #: [id number]    Destination: home with family    Discharge Medications:      Medication List        START taking these medications      hydrOXYzine HCl 50 MG tablet  Commonly known as: ATARAX  Take 1 tablet by mouth 3 times daily as needed for Anxiety  Notes to patient: anxiety     sertraline 25 MG tablet  Commonly known as: ZOLOFT  Take 1 tablet by mouth daily  Notes to patient: mood            CONTINUE taking these medications      ARIPiprazole 10 MG tablet  Commonly known as: ABILIFY  Notes to patient: mood     Aristada 1064 MG/3.9ML Prsy injection  Generic drug: ARIPiprazole Lauroxil  Notes to patient: mood     atomoxetine 80 MG capsule  Commonly known as: STRATTERA  Notes to patient: ADHD            STOP taking these medications      famotidine 20 MG tablet  Commonly known as: Pepcid               Where to Get Your Medications        These medications were sent to [address] [place] [address]      Phone: [phone]   hydrOXYzine HCl 50 MG tablet  sertraline 25 MG tablet         Follow Up Appointment: [facility]  [address], [facility]  [phone]  fax [phone]  Follow up on 5/15/2023  Ricardo Huertas have a scheduled appointment on Monday May 15th at 3:45 with Nurse Dario Leal at the [facility] location.     Please discharge Pt to:  [facility] [employer] [address], [address]  Follow up on 2023  Pt will haver a ride home at discharge